# Patient Record
Sex: FEMALE | Race: WHITE
[De-identification: names, ages, dates, MRNs, and addresses within clinical notes are randomized per-mention and may not be internally consistent; named-entity substitution may affect disease eponyms.]

---

## 2021-02-09 ENCOUNTER — HOSPITAL ENCOUNTER (EMERGENCY)
Dept: HOSPITAL 76 - ED | Age: 26
Discharge: HOME | End: 2021-02-09
Payer: COMMERCIAL

## 2021-02-09 VITALS — SYSTOLIC BLOOD PRESSURE: 170 MMHG | DIASTOLIC BLOOD PRESSURE: 87 MMHG
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
## 2021-02-09 DIAGNOSIS — S39.012A: Primary | ICD-10-CM
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
## 2021-02-09 PROCEDURE — 96372 THER/PROPH/DIAG INJ SC/IM: CPT

## 2021-02-09 PROCEDURE — 99284 EMERGENCY DEPT VISIT MOD MDM: CPT

## 2021-02-09 PROCEDURE — 99283 EMERGENCY DEPT VISIT LOW MDM: CPT

## 2021-02-09 PROCEDURE — 72100 X-RAY EXAM L-S SPINE 2/3 VWS: CPT

## 2021-02-09 NOTE — XRAY REPORT
PROCEDURE:  Lumbar Spine 2 View

 

INDICATIONS:  lifting injury severe pain

 

TECHNIQUE:  2 views of the lumbar spine were acquired.  

 

COMPARISON:  None.

 

FINDINGS:  

 

Bones:  5 non-rib-bearing vertebrae are present, with a transitional element at L5.  There is normal 
bony alignment.  No vertebral body compression fractures.  No suspicious bony lesions.  Loss of briana
l lumbar lordosis.

 

Soft tissues:  Overlying bowel gas pattern is normal.  No suspicious soft tissue calcifications.  

 

IMPRESSION:  

1. Loss of normal lumbar lordosis, suggestive of muscle spasm.

2. No acute fracture. No osseous lesion. If symptoms and/or clinical suspicion for pathology continue
, further assessment with repeat plain films, or advanced imaging (e.g., CT, MRI, or bone scan) is re
commended for further assessment.

 

Reviewed by: Prasanth Arguelles MD on 2/9/2021 10:34 AM PST

Approved by: Prasanth Arguelles MD on 2/9/2021 10:34 AM [facility]

 

 

Station ID:  [id number]

## 2021-02-09 NOTE — ED PHYSICIAN DOCUMENTATION
PD HPI BACK PAIN





- Stated complaint


Stated Complaint: LOW BACK PP





- Chief complaint


Chief Complaint: Back Pain





- History obtained from


History obtained from: Patient





- History of Present Illness


Timing - onset: Today


Timing - duration: Hours


Timing - details: Abrupt onset, Still present


Location: Lower, Right


Quality: Pain, Spasm, Sharp


Associated symptoms: No: Fever, Weakness, Numbness, Incontinent of urine, Unable

to urinate, Hematuria, Incontinent of stool


Improves with: Rest


Worsened by: Movement, Lifting, Twisting, Palpation


Contributing factors: Lifting


Similar symptoms before: Has not had sx before


Recently seen: Not recently seen





- Additional information


Additional information: 





25-year-old female otherwise healthy was in the gym this morning working out she

did 185 pound dead lift and had a sudden onset of pain in her low back.  She has

pain with any movement and this seems to be more onto the right side.  She 

denies any numbness or tingling to her extremities she denies any difficulty 

with her gait and denies any perineal anesthesia or difficulty with her bowel or

bladder.





Review of Systems


Constitutional: denies: Fever


Eyes: denies: Decreased vision


Ears: denies: Ear pain


Nose: denies: Congestion


Throat: denies: Sore throat


Cardiac: denies: Chest pain / pressure, Palpitations


Respiratory: denies: Dyspnea, Cough


GI: denies: Abdominal Pain, Nausea, Vomiting


: denies: Dysuria, Frequency


Skin: denies: Rash


Musculoskeletal: reports: Back pain.  denies: Neck pain, Extremity pain, Joint 

pain, Extremity swelling


Neurologic: denies: Generalized weakness, Focal weakness





PD PAST MEDICAL HISTORY





- Present Medications


Home Medications: 


                                Ambulatory Orders











 Medication  Instructions  Recorded  Confirmed


 


Cyclobenzaprine [Flexeril] 10 mg PO TID PRN #20 tab 02/09/21 


 


HYDROcod/ACETAM 5/325 [Norco 5/325] 1 - 2 ea PO Q6H PRN #14 tab 02/09/21 














- Allergies


Allergies/Adverse Reactions: 


                                    Allergies











Allergy/AdvReac Type Severity Reaction Status Date / Time


 


amoxicillin Allergy  Hives Verified 02/09/21 09:31














PD ED PE NORMAL





- Vitals


Vital signs reviewed: Yes (hypertensive )





- General


General: Alert and oriented X 3, No acute distress, Well developed/nourished





- HEENT


HEENT: Atraumatic, PERRL, EOMI





- Respiratory


Respiratory: No respiratory distress





- Back


Back: No CVA TTP, No spinal TTP, Other (There is marked tenderness to the 

paraspinal muscles on the right side of the lumbar spine and this extends to the

sciatic notch. )





- Derm


Derm: Normal color, Warm and dry, No rash





- Extremities


Extremities: No deformity, No edema





- Neuro


Neuro: Alert and oriented X 3, CNs 2-12 intact, No motor deficit, No sensory 

deficit, Normal speech


Eye Opening: Spontaneous


Motor: Obeys Commands


Verbal: Oriented


GCS Score: 15





- Psych


Psych: Normal mood, Normal affect





Results





- Vitals


Vitals: 


                               Vital Signs - 24 hr











  02/09/21





  09:32


 


Temperature 36.4 C L


 


Heart Rate 84


 


Respiratory 18





Rate 


 


Blood Pressure 137/71 H


 


O2 Saturation 99








                                     Oxygen











O2 Source                      Room air

















- Rads (name of study)


  ** lumbar spine


Radiology: Prelim report reviewed (Impression: 1.  Loss of normal lumbar 

lordosis, suggestive of muscle spasm.  No acute fracture.  No osseous lesion.), 

EMP read indepedently, See rad report





PD MEDICAL DECISION MAKING





- ED course


Complexity details: reviewed results, re-evaluated patient, considered 

differential, d/w patient


ED course: 





Previously well 25-year-old female with a lifting injury has sudden severe back 

pain after lifting she does have a musculoskeletal component to this and there 

is no evidence of a compression fracture to the lumbar vertebrae.  She is 

treated with dexamethasone and Toradol here in the emergency department we will 

place her on a short course of pain medication a muscle relaxant and off work 

for 3 days.





Departure





- Departure


Disposition: 01 Home, Self Care


Clinical Impression: 


Lumbar strain


Qualifiers:


 Encounter type: initial encounter Qualified Code(s): S39.012A - Strain of musc

le, fascia and tendon of lower back, initial encounter





Condition: Stable


Instructions:  ED Sprain Strain Lumbar


Follow-Up: 


[facility] [Provider Group]


Prescriptions: 


Cyclobenzaprine [Flexeril] 10 mg PO TID PRN #20 tab


 PRN Reason: Spasms


HYDROcod/ACETAM 5/325 [Norco 5/325] 1 - 2 ea PO Q6H PRN #14 tab


 PRN Reason: Pain


Forms:  Activity restrictions